# Patient Record
Sex: FEMALE | Race: WHITE | NOT HISPANIC OR LATINO | URBAN - METROPOLITAN AREA
[De-identification: names, ages, dates, MRNs, and addresses within clinical notes are randomized per-mention and may not be internally consistent; named-entity substitution may affect disease eponyms.]

---

## 2018-12-06 ENCOUNTER — OUTPATIENT (OUTPATIENT)
Dept: OUTPATIENT SERVICES | Facility: HOSPITAL | Age: 53
LOS: 1 days | End: 2018-12-06
Payer: COMMERCIAL

## 2018-12-06 ENCOUNTER — APPOINTMENT (OUTPATIENT)
Dept: MAMMOGRAPHY | Facility: HOSPITAL | Age: 53
End: 2018-12-06
Payer: COMMERCIAL

## 2018-12-06 ENCOUNTER — APPOINTMENT (OUTPATIENT)
Dept: ULTRASOUND IMAGING | Facility: HOSPITAL | Age: 53
End: 2018-12-06
Payer: COMMERCIAL

## 2018-12-06 PROCEDURE — 77067 SCR MAMMO BI INCL CAD: CPT | Mod: 26

## 2018-12-06 PROCEDURE — 76641 ULTRASOUND BREAST COMPLETE: CPT | Mod: 26,50

## 2018-12-06 PROCEDURE — 77067 SCR MAMMO BI INCL CAD: CPT

## 2018-12-06 PROCEDURE — 77063 BREAST TOMOSYNTHESIS BI: CPT

## 2018-12-06 PROCEDURE — 77063 BREAST TOMOSYNTHESIS BI: CPT | Mod: 26

## 2018-12-06 PROCEDURE — 76641 ULTRASOUND BREAST COMPLETE: CPT

## 2021-03-27 ENCOUNTER — APPOINTMENT (OUTPATIENT)
Age: 56
End: 2021-03-27
Payer: COMMERCIAL

## 2021-03-27 PROCEDURE — 0001A: CPT

## 2021-04-17 ENCOUNTER — APPOINTMENT (OUTPATIENT)
Age: 56
End: 2021-04-17
Payer: COMMERCIAL

## 2021-04-17 PROCEDURE — 0002A: CPT

## 2021-05-15 ENCOUNTER — EMERGENCY (EMERGENCY)
Facility: HOSPITAL | Age: 56
LOS: 1 days | Discharge: HOME | End: 2021-05-15
Admitting: EMERGENCY MEDICINE
Payer: COMMERCIAL

## 2021-05-15 VITALS
OXYGEN SATURATION: 96 % | RESPIRATION RATE: 18 BRPM | HEART RATE: 80 BPM | TEMPERATURE: 98 F | DIASTOLIC BLOOD PRESSURE: 83 MMHG | SYSTOLIC BLOOD PRESSURE: 125 MMHG

## 2021-05-15 DIAGNOSIS — Z20.822 CONTACT WITH AND (SUSPECTED) EXPOSURE TO COVID-19: ICD-10-CM

## 2021-05-15 LAB — SARS-COV-2 RNA SPEC QL NAA+PROBE: SIGNIFICANT CHANGE UP

## 2021-05-15 PROCEDURE — 99282 EMERGENCY DEPT VISIT SF MDM: CPT

## 2021-05-15 NOTE — ED ADULT TRIAGE NOTE - CHIEF COMPLAINT QUOTE
Patient requesting COVID-19 testing; patient is currently asymptomatic with no recent travel or know exposure. Pt fully vaccinated against COVID 19.

## 2021-05-15 NOTE — ED PROVIDER NOTE - OBJECTIVE STATEMENT
Pt presents today requesting covid screening. denies any symptoms including fever, chills, n/v/d, abd pain, cp, sob, sore throat, any other concerns. denies any recent travel or known covid positive exposures.

## 2021-05-15 NOTE — ED PROVIDER NOTE - PATIENT PORTAL LINK FT
You can access the FollowMyHealth Patient Portal offered by [facility] by registering at the following website: [email or web address]. By joining [email or web address]’s FollowMyHealth portal, you will also be able to view your health information using other applications (apps) compatible with our system.

## 2024-03-04 ENCOUNTER — APPOINTMENT (OUTPATIENT)
Dept: MAMMOGRAPHY | Facility: HOSPITAL | Age: 59
End: 2024-03-04
Payer: COMMERCIAL

## 2024-03-04 ENCOUNTER — APPOINTMENT (OUTPATIENT)
Dept: ULTRASOUND IMAGING | Facility: HOSPITAL | Age: 59
End: 2024-03-04
Payer: COMMERCIAL

## 2024-03-04 ENCOUNTER — OUTPATIENT (OUTPATIENT)
Dept: OUTPATIENT SERVICES | Facility: HOSPITAL | Age: 59
LOS: 1 days | End: 2024-03-04
Payer: COMMERCIAL

## 2024-03-04 PROCEDURE — 77063 BREAST TOMOSYNTHESIS BI: CPT

## 2024-03-04 PROCEDURE — 76641 ULTRASOUND BREAST COMPLETE: CPT | Mod: 26,50

## 2024-03-04 PROCEDURE — 77067 SCR MAMMO BI INCL CAD: CPT | Mod: 26

## 2024-03-04 PROCEDURE — 77067 SCR MAMMO BI INCL CAD: CPT

## 2024-03-04 PROCEDURE — 76641 ULTRASOUND BREAST COMPLETE: CPT

## 2024-03-04 PROCEDURE — 77063 BREAST TOMOSYNTHESIS BI: CPT | Mod: 26

## 2025-04-13 NOTE — ED ADULT TRIAGE NOTE - NS ED NURSE DIRECT TO ROOM YN
Patient unable to learn at this time    Problem: At Risk for Falls  Goal: Patient does not fall  Outcome: Monitoring/Evaluating progress  Goal: Patient takes action to control fall-related risks  Outcome: Monitoring/Evaluating progress     Problem: Diabetes  Goal: Achieves glycemic balance  Description: Goal is to maintain blood sugar within range with no episodes of hypoglycemia  Outcome: Monitoring/Evaluating progress  Goal: Verbalizes/demonstrates understanding of NEW diagnosis of diabetes and management  Description: Document on Patient Education Activity  Outcome: Monitoring/Evaluating progress  Goal: Verbalizes understanding of diabetes management including how to use HbA1C to evaluate status of blood sugar over time (Diabetes is NOT a new diagnosis)  Description: Diabetes Education  Outcome: Monitoring/Evaluating progress  Goal: Demonstrates ability to self-administer insulin  Description: Document on Patient Education Activity  Outcome: Monitoring/Evaluating progress      Yes

## 2025-06-30 ENCOUNTER — OUTPATIENT (OUTPATIENT)
Dept: OUTPATIENT SERVICES | Facility: HOSPITAL | Age: 60
LOS: 1 days | End: 2025-06-30

## 2025-06-30 ENCOUNTER — APPOINTMENT (OUTPATIENT)
Dept: MAMMOGRAPHY | Facility: CLINIC | Age: 60
End: 2025-06-30
Payer: COMMERCIAL

## 2025-06-30 ENCOUNTER — APPOINTMENT (OUTPATIENT)
Dept: ULTRASOUND IMAGING | Facility: CLINIC | Age: 60
End: 2025-06-30
Payer: COMMERCIAL

## 2025-06-30 PROCEDURE — 77063 BREAST TOMOSYNTHESIS BI: CPT | Mod: 26

## 2025-06-30 PROCEDURE — 77067 SCR MAMMO BI INCL CAD: CPT | Mod: 26

## 2025-06-30 PROCEDURE — 76641 ULTRASOUND BREAST COMPLETE: CPT | Mod: 26,50
